# Patient Record
Sex: FEMALE | Race: ASIAN | NOT HISPANIC OR LATINO | Employment: STUDENT | URBAN - METROPOLITAN AREA
[De-identification: names, ages, dates, MRNs, and addresses within clinical notes are randomized per-mention and may not be internally consistent; named-entity substitution may affect disease eponyms.]

---

## 2018-06-15 ENCOUNTER — CLINICAL SUPPORT (OUTPATIENT)
Dept: RETAIL CLINIC | Facility: CLINIC | Age: 23
End: 2018-06-15

## 2018-06-15 DIAGNOSIS — Z11.1 VISIT FOR TB SKIN TEST: Primary | ICD-10-CM

## 2018-06-15 PROCEDURE — 86580 TB INTRADERMAL TEST: CPT | Performed by: NURSE PRACTITIONER

## 2018-06-15 NOTE — PROGRESS NOTES
CC:Presents for Tb screening.     S: Has never had a positive test for Tb or been infected with Tb.  Denies symptoms of active Tb and risk factors for acquiring latent or active Tb:  Has not had a cough> 3 weeks, hemoptysis, unexplained fever, unexplained weight loss, fatigue, night sweats, or change in appetite.  s not a high risk contact of person known or suspected of having Tb.  Has not been to another country for 3 or more months where Tb is common.  Has been in the [place] for > 5 years  Is not a resident or employee of high Tb risk congregate setting.  Is not a health care worker who serves high-risk patients.  Is not medically underserved.  Has not been homeless in past 2 years.  Does not inject illicit drugs or use crack cocaine.  Is not HIV positive, or considered at risk for HIV if status is unknown.   Is not imunosuppressed or on immunosuppressive therapy.  Is not malnourished or >10% below ideal body weight.    O: Appears well today. Respirations are even & unlabored. Lungs are CTA bilaterally.    A: PPD placed today as directed.     P: RTC in 8-72 hours for reading. See scanned copy.     Eileen Quintana, APRN

## 2018-06-25 ENCOUNTER — CLINICAL SUPPORT (OUTPATIENT)
Dept: RETAIL CLINIC | Facility: CLINIC | Age: 23
End: 2018-06-25

## 2018-06-25 DIAGNOSIS — Z11.1 VISIT FOR TB SKIN TEST: Primary | ICD-10-CM

## 2018-06-25 PROCEDURE — 86580 TB INTRADERMAL TEST: CPT | Performed by: NURSE PRACTITIONER

## 2018-06-25 NOTE — PROGRESS NOTES
CC:Presents for Tb screening.     S: Has never had a positive test for Tb or been infected with Tb.  Denies symptoms of active Tb and risk factors for acquiring latent or active Tb:  Has not had a cough> 3 weeks, hemoptysis, unexplained fever, unexplained weight loss, fatigue, night sweats, or change in appetite.  s not a high risk contact of person known or suspected of having Tb.  Has not been to another country for 3 or more months where Tb is common.  Has been in the [place] for > 5 years  Is not a resident or employee of high Tb risk congregate setting.  Is not a health care worker who serves high-risk patients.  Is not medically underserved.  Has not been homeless in past 2 years.  Does not inject illicit drugs or use crack cocaine.  Is not HIV positive, or considered at risk for HIV if status is unknown.   Is not imunosuppressed or on immunosuppressive therapy.  Is not malnourished or >10% below ideal body weight.    O: Appears well today. Respirations are even & unlabored. Lungs are CTA bilaterally.    A: tb skin test placed as directed.     P: See scanned copy.     Eileen Quintana, RUDDY

## 2018-06-25 NOTE — PATIENT INSTRUCTIONS
Tuberculin Skin Test  Why am I having this test?  Tuberculosis (TB) is a bacterial infection caused by Mycobacterium tuberculosis. Most people who are exposed to these bacteria have a strong enough defense (immune) system to prevent the bacteria from causing TB and developing symptoms. Their bodies prevent the germs from being active and making them sick (latent TB infection).  However, if you have TB germs in your body and your immune system is weak, you can develop a TB infection. This can cause symptoms such as:  · Night sweats.  · Fever.  · Weakness.  · Weight loss.    A latent TB infection can also become active later in life if your immune system becomes weakened or compromised.  You may have this test if your health care provider suspects that you have TB. You may also have this test to screen for TB if you are at risk for getting the disease. Those at increased risk include:  · People who inject illegal drugs or share needles.  · People with HIV or other diseases that affect immunity.  · Health care workers.  · People who live in high-risk communities, such as homeless shelters, nursing homes, and correctional facilities.  · People who have been in contact with someone with TB.  · People from countries where TB is more common.    If you are in a high-risk group, your health care provider may wish to screen for TB more often. This can help prevent the spread of the disease. Sometimes TB screening is required when starting a new job, such as becoming a health care worker or a teacher. Colleges or universities may require it of new students.  What is being tested?  A tuberculin skin test is the main test used to check for exposure to the bacteria that can cause TB. The test checks for antibodies to the bacteria. Antibodies are proteins that your body produces to protect you from germs and other things that can make you sick.  Your health care provider will inject a solution known as PPD (purified protein  derivative) under the first layer of skin on your arm. This causes a blister-like bubble to form at the site. Your health care provider will then examine the site after a number of hours have passed to see if a reaction has occurred.  How do I prepare for this test?  There is no preparation required for this test.  What do the results mean?  Your test results will be reported as either negative or positive.  If the tuberculin skin test produces a negative result, it is likely that you do not have TB and have not been exposed to the TB bacteria.  If you or your health care provider suspects exposure, however, you may want to repeat the test a few weeks later. A blood test may also be used to check for TB. This is because you will not react to the tuberculin skin test until several weeks after exposure to TB bacteria.  If you test positive to the tuberculin skin test, it is likely that you have been exposed to TB bacteria. The test does not distinguish between an active and a latent TB infection.  A false-positive result can occur. A false-positive result for TB bacteria is incorrect because it indicates a condition or finding is present when it is not.  Talk to your health care provider to discuss your results, treatment options, and if necessary, the need for more tests.  It is your responsibility to obtain your test results. Ask the lab or department performing the test when and how you will get your results. Talk with your health care provider if you have any questions about your results.  Talk with your health care provider to discuss your results, treatment options, and if necessary, the need for more tests. Talk with your health care provider if you have any questions about your results.  This information is not intended to replace advice given to you by your health care provider. Make sure you discuss any questions you have with your health care provider.  Document Released: 09/27/2006 Document Revised:  08/20/2017 Document Reviewed: 04/13/2015  Elsevier Interactive Patient Education © 2018 [employer]

## 2018-06-27 ENCOUNTER — DOCUMENTATION (OUTPATIENT)
Dept: RETAIL CLINIC | Facility: CLINIC | Age: 23
End: 2018-06-27

## 2018-06-27 LAB
INDURATION: 0 MM (ref 0–10)
TB SKIN TEST: NEGATIVE

## 2021-01-12 ENCOUNTER — IMMUNIZATION (OUTPATIENT)
Dept: VACCINE CLINIC | Facility: HOSPITAL | Age: 26
End: 2021-01-12

## 2021-01-12 PROCEDURE — 91301 HC SARSCO02 VAC 100MCG/0.5ML IM: CPT | Performed by: INTERNAL MEDICINE

## 2021-01-12 PROCEDURE — 0011A: CPT | Performed by: INTERNAL MEDICINE

## 2021-02-09 ENCOUNTER — IMMUNIZATION (OUTPATIENT)
Dept: VACCINE CLINIC | Facility: HOSPITAL | Age: 26
End: 2021-02-09

## 2021-02-09 PROCEDURE — 91301 HC SARSCO02 VAC 100MCG/0.5ML IM: CPT | Performed by: INTERNAL MEDICINE

## 2021-02-09 PROCEDURE — 0012A: CPT | Performed by: INTERNAL MEDICINE

## 2023-03-14 ENCOUNTER — HOSPITAL ENCOUNTER (EMERGENCY)
Age: 28
Discharge: HOME | End: 2023-03-14
Attending: STUDENT IN AN ORGANIZED HEALTH CARE EDUCATION/TRAINING PROGRAM
Payer: COMMERCIAL

## 2023-03-14 VITALS
WEIGHT: 159.44 LBS | SYSTOLIC BLOOD PRESSURE: 134 MMHG | BODY MASS INDEX: 25.62 KG/M2 | TEMPERATURE: 98.5 F | HEIGHT: 66 IN | RESPIRATION RATE: 18 BRPM | OXYGEN SATURATION: 98 % | HEART RATE: 96 BPM | DIASTOLIC BLOOD PRESSURE: 77 MMHG

## 2023-03-14 DIAGNOSIS — R05.1 ACUTE COUGH: ICD-10-CM

## 2023-03-14 DIAGNOSIS — H65.01 NON-RECURRENT ACUTE SEROUS OTITIS MEDIA OF RIGHT EAR: ICD-10-CM

## 2023-03-14 DIAGNOSIS — J02.9 ACUTE PHARYNGITIS, UNSPECIFIED ETIOLOGY: Primary | ICD-10-CM

## 2023-03-14 LAB
FLUAV RNA UPPER RESP QL NAA+PROBE: NEGATIVE
FLUBV AG NPH QL: NEGATIVE
S PYO AG THROAT QL: NEGATIVE
SARS-COV-2 RDRP RESP QL NAA+PROBE: NOT DETECTED

## 2023-03-14 PROCEDURE — 87804 INFLUENZA ASSAY W/OPTIC: CPT

## 2023-03-14 PROCEDURE — 6360000002 HC RX W HCPCS: Performed by: STUDENT IN AN ORGANIZED HEALTH CARE EDUCATION/TRAINING PROGRAM

## 2023-03-14 PROCEDURE — 87081 CULTURE SCREEN ONLY: CPT

## 2023-03-14 PROCEDURE — 99283 EMERGENCY DEPT VISIT LOW MDM: CPT

## 2023-03-14 PROCEDURE — 87880 STREP A ASSAY W/OPTIC: CPT

## 2023-03-14 PROCEDURE — 87635 SARS-COV-2 COVID-19 AMP PRB: CPT

## 2023-03-14 RX ORDER — DEXAMETHASONE 4 MG/1
6 TABLET ORAL ONCE
Status: COMPLETED | OUTPATIENT
Start: 2023-03-14 | End: 2023-03-14

## 2023-03-14 RX ORDER — AZITHROMYCIN 250 MG/1
250 TABLET, FILM COATED ORAL SEE ADMIN INSTRUCTIONS
Qty: 6 TABLET | Refills: 0 | Status: SHIPPED | OUTPATIENT
Start: 2023-03-14 | End: 2023-03-19

## 2023-03-14 RX ORDER — METHYLPREDNISOLONE 4 MG/1
TABLET ORAL
Qty: 1 KIT | Refills: 0 | Status: SHIPPED | OUTPATIENT
Start: 2023-03-14 | End: 2023-03-20

## 2023-03-14 RX ADMIN — DEXAMETHASONE 6 MG: 4 TABLET ORAL at 11:29

## 2023-03-14 ASSESSMENT — PAIN DESCRIPTION - LOCATION: LOCATION: THROAT

## 2023-03-14 ASSESSMENT — PAIN DESCRIPTION - PAIN TYPE: TYPE: ACUTE PAIN

## 2023-03-14 ASSESSMENT — PAIN - FUNCTIONAL ASSESSMENT
PAIN_FUNCTIONAL_ASSESSMENT: 0-10
PAIN_FUNCTIONAL_ASSESSMENT: NONE - DENIES PAIN

## 2023-03-14 ASSESSMENT — PAIN DESCRIPTION - DESCRIPTORS: DESCRIPTORS: ACHING

## 2023-03-14 ASSESSMENT — PAIN SCALES - GENERAL: PAINLEVEL_OUTOF10: 6

## 2023-03-14 ASSESSMENT — PAIN DESCRIPTION - FREQUENCY: FREQUENCY: CONTINUOUS

## 2023-03-14 NOTE — DISCHARGE INSTRUCTIONS
You were seen in the emergency department with sore throat and cough. Fortunately your rapid COVID, influenza and strep were negative. The culture still pending. I am starting you on an antibiotic for your right ear infection. Please follow-up with your primary care provider as needed. You can return to the emergency department at anytime with any new, ongoing or worsening symptoms. We hope you feel better soon!

## 2023-03-14 NOTE — ED NOTES
Patient to ed with complaints of sore throat and cough which started over 1 week ago and worsened.      Leo Hogue RN  03/14/23 3977

## 2023-03-14 NOTE — ED PROVIDER NOTES
Emergency Department Provider Note  Location: [address] [place] [address]  3/14/2023     Patient Identification  Man Chris Nugent is a 33 y.o. female    Chief Complaint  Pharyngitis and Cough      Mode of Arrival  private car    HPI  (History provided by patient)  This is a 33 y.o. female without relevant past medical history presented today for sore throat. Symptoms have been ongoing for the last week with no improvement. Patient reports that she lost her voice the past couple days. She is endorsing dry nonproductive cough. She reports subjective fever, chills and body aches. Denies any nausea or vomiting. She is endorsing right-sided ear pain. She denies any throat swelling or shortness of breath. Denies any abdominal pain or changes to bowel or bladder. Denies any syncope or new numbness or tingling. Denies any recent dental procedures. ROS  Review of Systems   All other systems reviewed and are negative. I have reviewed the following nursing documentation:  Allergies: No Known Allergies    Past medical history:  has no past medical history on file. Past surgical history:  has no past surgical history on file. Home medications:   Prior to Admission medications    Medication Sig Start Date End Date Taking? Authorizing Provider   azithromycin (ZITHROMAX) 250 MG tablet Take 1 tablet by mouth See Admin Instructions for 5 days 500mg on day 1 followed by 250mg on days 2 - 5 3/14/23 3/19/23 Yes Heather Dc MD   methylPREDNISolone (MEDROL DOSEPACK) 4 MG tablet Take by mouth. 3/14/23 3/20/23 Yes Heather Dc MD       Social history:  reports that she has never smoked. She has never used smokeless tobacco. She reports that she does not currently use alcohol. She reports that she does not use drugs. Family history:  History reviewed. No pertinent family history.     Exam  ED Triage Vitals [03/14/23 1032]   BP Temp Temp Source Heart Rate Resp SpO2 Height Weight   134/77 98.5 °F (36.9 °C) Oral 96 18 98 % 5' 6\" (1.676 m) 159 lb 7 oz (72.3 kg)     Physical Exam  Vitals and nursing note reviewed. Constitutional:       General: She is not in acute distress. HENT:      Head: Normocephalic and atraumatic. Right Ear: External ear normal. A middle ear effusion is present. Tympanic membrane is erythematous. Left Ear: External ear normal.      Nose: Congestion and rhinorrhea present. Mouth/Throat:      Pharynx: Posterior oropharyngeal erythema present. No oropharyngeal exudate or uvula swelling. Tonsils: No tonsillar exudate or tonsillar abscesses. Eyes:      Conjunctiva/sclera: Conjunctivae normal.   Cardiovascular:      Rate and Rhythm: Normal rate and regular rhythm. Pulses: Normal pulses. Heart sounds: Normal heart sounds. Pulmonary:      Effort: Pulmonary effort is normal.      Breath sounds: Normal breath sounds. Abdominal:      General: There is no distension. Palpations: Abdomen is soft. Tenderness: There is no abdominal tenderness. Musculoskeletal:         General: Normal range of motion. Cervical back: Normal range of motion and neck supple. No rigidity. Right lower leg: No edema. Left lower leg: No edema. Skin:     General: Skin is warm. Capillary Refill: Capillary refill takes less than 2 seconds. Neurological:      General: No focal deficit present. Mental Status: She is alert. Cranial Nerves: No cranial nerve deficit. Psychiatric:         Mood and Affect: Mood normal.         Behavior: Behavior normal.           MDM/ED Course  ED Medication Orders (From admission, onward)      Start Ordered     Status Ordering Provider    03/14/23 1115 03/14/23 1101  dexamethasone (DECADRON) tablet 6 mg  ONCE         Last MAR action: Given - by Karen Mora on 03/14/23 at [address]              Radiology  No results found.       Labs  Results for orders placed or performed during the hospital encounter of 03/14/23 COVID-19, Rapid    Specimen: Nasopharyngeal Swab   Result Value Ref Range    SARS-CoV-2, NAAT Not Detected Not Detected   Rapid influenza A/B antigens    Specimen: Nasopharyngeal   Result Value Ref Range    Rapid Influenza A Ag Negative Negative    Rapid Influenza B Ag Negative Negative   Strep Screen Group A Throat    Specimen: Throat   Result Value Ref Range    Rapid Strep A Screen Negative Negative         - Patient seen and evaluated in room 07.  33 y.o. female presented for sore throat. Patient presented normotensive 134/77/ afebrile, normal HR, normal RR and O2 saturation. On exam she has posterior oropharyngeal erythema but no exudate. Her right tympanic membrane was erythematous with some mild middle ear effusion. Her uvula was midline with no significant edema or deviation. Given history and exam my differential diagnosis includes but is not limited to strep pharyngitis, viral pharyngitis, COVID-19, influenza, otitis media. She has no decreased breath sounds to suggest underlying pneumonia. No signs of severe sepsis. - Patient was placed on telemetry during her ED stay and no malignant dysrhythmia observed. - Pertinent old records reviewed.   -No significant chronic medical conditions  -Denies any tobacco use, significant alcohol use. She has no history of depression or anxiety. She has no food or housing insecurity.  - Patient was given 6mg oral decadron in the ED. Upon reassessment, patient remained hemodynamically stable. - Diagnostic studies reviewed and interpreted by me   - Lab:  Rapid strep negative with culture pending  Rapid COVID-19 negative  Influenza negative    - I discussed the results with patient. We agreed to discharge home. Patient with acute right-sided otitis media, acute pharyngitis, acute cough. - Return precautions also discussed. patient verbalized understanding of care plan and agreed to follow-up with PCP as advised. Clinical Impression:  1.  Acute pharyngitis, unspecified etiology    2. Non-recurrent acute serous otitis media of right ear    3. Acute cough          Disposition:  Discharge to home in good condition. Blood pressure 134/77, pulse 96, temperature 98.5 °F (36.9 °C), temperature source Oral, resp. rate 18, height 5' 6\" (1.676 m), weight 159 lb 7 oz (72.3 kg), SpO2 98 %. Patient was given scripts for the following medications. I counseled patient how to take these medications. New Prescriptions    AZITHROMYCIN (ZITHROMAX) 250 MG TABLET    Take 1 tablet by mouth See Admin Instructions for 5 days 500mg on day 1 followed by 250mg on days 2 - 5    METHYLPREDNISOLONE (MEDROL DOSEPACK) 4 MG TABLET    Take by mouth. Disposition referral (if applicable):  [facility]  [phone]          Total critical care time is 0 minutes, which excludes separately billable procedures and updating family. Time spent is specifically for management of the presenting complaint and symptoms initially, direct bedside care, reevaluation, review of records, and consultation. There was a high probability of clinically significant life-threatening deterioration in the patient's condition, which required my urgent intervention. This chart was generated in part by using Dragon Dictation system and may contain errors related to that system including errors in grammar, punctuation, and spelling, as well as words and phrases that may be inappropriate. If there are any questions or concerns please feel free to contact the dictating provider for clarification.      Jah Jerez MD  [address] [place] [address]        Jah Jerez MD  03/14/23 1431

## 2023-03-14 NOTE — ED NOTES
Patient given prescription, discharge instructions verbal and written, patient verbalized understanding. Alert/oriented X4, Clear speech.   Patient exhibits no distress, ambulates with steady gait per self leaving unit, no further request.      Court GILLES Son  03/14/23 0068

## 2023-03-16 LAB — S PYO THROAT QL CULT: NORMAL
